# Patient Record
Sex: MALE | Race: BLACK OR AFRICAN AMERICAN | NOT HISPANIC OR LATINO | URBAN - NONMETROPOLITAN AREA
[De-identification: names, ages, dates, MRNs, and addresses within clinical notes are randomized per-mention and may not be internally consistent; named-entity substitution may affect disease eponyms.]

---

## 2019-05-09 ENCOUNTER — IMPORTED ENCOUNTER (OUTPATIENT)
Dept: URBAN - NONMETROPOLITAN AREA CLINIC 1 | Facility: CLINIC | Age: 54
End: 2019-05-09

## 2019-05-09 PROBLEM — H52.13: Noted: 2019-05-09

## 2019-05-09 PROBLEM — H40.1132: Noted: 2019-05-10

## 2019-05-09 PROBLEM — H25.813: Noted: 2019-05-09

## 2019-05-09 PROBLEM — H52.223: Noted: 2019-05-09

## 2019-05-09 PROBLEM — H52.4: Noted: 2019-05-09

## 2019-05-09 PROCEDURE — 92133 CPTRZD OPH DX IMG PST SGM ON: CPT

## 2019-05-09 PROCEDURE — S0620 ROUTINE OPHTHALMOLOGICAL EXA: HCPCS

## 2019-05-09 NOTE — PATIENT DISCUSSION
COAG-IOP 26 OU checked by [facility] post dilation -START Latanoprost qhs OU as directed. -Stressed importance of compliance. -OCT ONH performed and reviewed w/pt-Refer to ShafranovCataract OU-Reviewed symptoms of advancing cataract growth such as glare and halos and decreased vision.-Continue to monitor for now. Pt will notify us if any new symptoms develop. RTC N/A VF 3 Wk IOP check [facility]'s Notes:  [facility] 05/10/19

## 2022-04-09 ASSESSMENT — TONOMETRY
OD_IOP_MMHG: 26
OD_IOP_MMHG: 15
OS_IOP_MMHG: 18
OS_IOP_MMHG: 26

## 2022-04-09 ASSESSMENT — VISUAL ACUITY
OD_CC: 20/40
OS_CC: 20/40